# Patient Record
Sex: MALE | Race: NATIVE HAWAIIAN OR OTHER PACIFIC ISLANDER
[De-identification: names, ages, dates, MRNs, and addresses within clinical notes are randomized per-mention and may not be internally consistent; named-entity substitution may affect disease eponyms.]

---

## 2018-08-31 ENCOUNTER — HOSPITAL ENCOUNTER (EMERGENCY)
Dept: HOSPITAL 5 - ED | Age: 78
Discharge: HOME | End: 2018-08-31
Payer: MEDICARE

## 2018-08-31 VITALS — SYSTOLIC BLOOD PRESSURE: 142 MMHG | DIASTOLIC BLOOD PRESSURE: 84 MMHG

## 2018-08-31 DIAGNOSIS — S43.004A: Primary | ICD-10-CM
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
## 2018-08-31 DIAGNOSIS — L03.115: ICD-10-CM

## 2018-08-31 PROCEDURE — 73030 X-RAY EXAM OF SHOULDER: CPT

## 2018-08-31 PROCEDURE — 73020 X-RAY EXAM OF SHOULDER: CPT

## 2018-08-31 PROCEDURE — 23650 CLTX SHO DSLC W/MNPJ WO ANES: CPT

## 2018-08-31 PROCEDURE — 96375 TX/PRO/DX INJ NEW DRUG ADDON: CPT

## 2018-08-31 PROCEDURE — 99284 EMERGENCY DEPT VISIT MOD MDM: CPT

## 2018-08-31 PROCEDURE — 20610 DRAIN/INJ JOINT/BURSA W/O US: CPT

## 2018-08-31 PROCEDURE — 96374 THER/PROPH/DIAG INJ IV PUSH: CPT

## 2018-08-31 NOTE — XRAY REPORT
Left shoulder 3 views: 



History: Obvious swelling deformity status post fall.



Findings:



There is anterior dislocation noted of the humeral head. The humeral 

head is noted anteroinferior to the glenoid. No evidence of fracture.



Impression:



Anterior dislocation right shoulder

## 2018-08-31 NOTE — EMERGENCY DEPARTMENT REPORT
ED Extremity Problem HPI





- General


Chief complaint: Shoulder Injury


Stated complaint: SHOULDER PAIN


Time Seen by Provider: 08/31/18 13:19


Source: patient


Mode of arrival: Ambulatory


Limitations: No Limitations





- History of Present Illness


Initial comments: 





Mr. Boykin is a healthy 77 yo male who presents with shoulder injury.  

He fell today onto his right shoulder.  He slipped in the shower.  Denies any 

headache or neck pain.  Denies any other injury.  He has severe pain in right 

shoulder with deformity.  He's had lower extremity swelling for 2 weeks.  

Brother was going to take the patient to doctor today for possible spider bite 

on both lower extremities.  Redness and tenderness of the right lower leg. Our 

ED staff member provided Taiwanese interpretation.


MD Complaint: extremity pain


-: Sudden, hour(s) (2)


Location: right, upper extremity


History of Same: No


Severity scale (0 -10): 10


Quality: aching


Consistency: constant


Worsens with: other (movement)


Associated Symptoms: denies other symptoms





- Related Data


 Previous Rx's











 Medication  Instructions  Recorded  Last Taken  Type


 


Sulfamethoxazole/Trimethoprim 1 each PO BID 7 Days #14 tablet 08/31/18 Unknown 

Rx





[Bactrim DS TAB]    











 Allergies











Allergy/AdvReac Type Severity Reaction Status Date / Time


 


No Known Allergies Allergy   Unverified 08/31/18 11:54














ED Review of Systems


ROS: 


Stated complaint: SHOULDER PAIN


Other details as noted in HPI





Comment: All other systems reviewed and negative


Constitutional: denies: fever, malaise


Respiratory: denies: cough


Cardiovascular: denies: chest pain





ED Past Medical Hx





- Past Medical History


Previous Medical History?: No





- Surgical History


Past Surgical History?: No





- Social History


Smoking Status: Never Smoker





- Medications


Home Medications: 


 Home Medications











 Medication  Instructions  Recorded  Confirmed  Last Taken  Type


 


Sulfamethoxazole/Trimethoprim 1 each PO BID 7 Days #14 tablet 08/31/18  Unknown 

Rx





[Bactrim DS TAB]     














ED Physical Exam





- General


Limitations: No Limitations


General appearance: alert, in no apparent distress





- Head


Head exam: Present: atraumatic, normocephalic





- Eye


Eye exam: Present: normal appearance





- ENT


ENT exam: Present: mucous membranes moist





- Neck


Neck exam: Present: normal inspection





- Respiratory


Respiratory exam: Present: normal lung sounds bilaterally.  Absent: respiratory 

distress, wheezes, rales, rhonchi





- Cardiovascular


Cardiovascular Exam: Present: regular rate, normal rhythm, normal heart sounds.

  Absent: systolic murmur, diastolic murmur, rubs, gallop





- GI/Abdominal


GI/Abdominal exam: Present: soft, normal bowel sounds.  Absent: distended, 

tenderness, guarding, rebound





- Rectal


Rectal exam: Present: deferred





- Extremities Exam


Extremities exam: Present: other (right shoulder squared off intact skin, 

intact hand  2+ radial pulse, intact sensation throughout the extremity)





- Back Exam


Back exam: Present: normal inspection





- Neurological Exam


Neurological exam: Present: alert, oriented X3





- Psychiatric


Psychiatric exam: Present: normal affect, normal mood





- Skin


Skin exam: Present: warm, dry, intact, normal color.  Absent: rash





ED Course


 Vital Signs











  08/31/18





  13:57


 


Respiratory 18





Rate 














- Joint Aspiration/Injection


Consent Obtained: verbal consent


Time Out Performed: Yes


Indications: to relieve pressure/pain


Side of Body: right


Joint Aspirated: shoulder


Ultrasound Guidance: No


Skin Prep: Povidone-Iodine1%


Local Anesthesia Used: Lidocaine 1%


Amount of Anesthesia Used (mls): 20


Needle Size Used: Other (21)


Syringe Size Used: Other (35cc)


Medication Injected, if any: Lidocaine


Amount of Medication Injected (mls): 20


Patient Tolerated Procedure: well





- Orthopedic Joint Reduction


  ** Joint #1


Consent Obtained: verbal consent


Time Out Performed: Yes


Side: right


Joint Reduction Location: shoulder


Analgesia: none (joint infection)


Local Anesthetic Used: Lidocaine 1%


Amount of Anesthetic Used (mls): 20


Shoulder Technique Used (if applicable): external rotation, Cunningham


Technique Used: direct manipulation


Post-Reduction Neuro Exam: intact


Post-Reduction Vascular Exam: intact


Post Reduction X-Ray Obtained: Yes


Post Reduction X-Ray Results: reduced


Splint Applied: Yes (shoulder immobilizer)


Patient Tolerated Procedure: well





ED Medical Decision Making





- Radiology Data


Radiology results: report reviewed, image reviewed


interpreted by me: 





post reduction xray showed adequately reduced right shoulder





first x-ray: anterior shoulder dislocation





- Medical Decision Making





Mr. Boykin presents with right shoulder dislocation after fall.  

Neurovascularly intact.  Reduced after joint injection of lidocaine.





dc'd home with shoulder immobilizer and rx for bactrim for mild cellulitis in 

right leg


Critical care attestation.: 


If time is entered above; I have spent that time in minutes in the direct care 

of this critically ill patient, excluding procedure time.








ED Disposition


Clinical Impression: 


 Cellulitis of leg, right, Anterior dislocation of right shoulder





Disposition: DC-01 TO HOME OR SELFCARE


Is pt being admited?: No


Does the pt Need Aspirin: No


Condition: Stable


Instructions:  Shoulder Dislocation (ED), Cellulitis (ED)


Prescriptions: 


Sulfamethoxazole/Trimethoprim [Bactrim DS TAB] 1 each PO BID 7 Days #14 tablet


Referrals: 


PRIMARY CARE,MD [Primary Care Provider] - 3-5 Days


Time of Disposition: 15:24


Print Language: Romanian

## 2018-08-31 NOTE — XRAY REPORT
FINAL REPORT



EXAM:  XR SHOULDER 1V RT



HISTORY:  Post  reduction 



COMPARISON:  None.



TECHNIQUE:  Two views of the right shoulder



FINDINGS:  

There is no acute fracture or dislocation. The glenohumeral and

acromioclavicular joint spaces are preserved. The overlying soft

tissues are intact.



IMPRESSION:  

No acute bony abnormality of the right shoulder.

## 2020-03-03 ENCOUNTER — HOSPITAL ENCOUNTER (EMERGENCY)
Dept: HOSPITAL 5 - ED | Age: 80
End: 2020-03-03
Payer: MEDICARE

## 2020-03-03 DIAGNOSIS — I46.9: Primary | ICD-10-CM

## 2020-03-03 NOTE — EMERGENCY DEPARTMENT REPORT
ED CPR HPI





- General


Stated Complaint: CARDIAC ARREST


Time Seen by Provider: 20 17:59





- History of Present Illness


Initial Comments: 





Patient is a 79-year-old  male who had just returned home from work with

his brother.  Patient sat on the couch and was found having difficulty breathing

and was unresponsive.  By the time paramedics got there 10 minutes after the 

incident occurred patient was pulseless.  Patient was intubated prior to arrival

and CPR initiated.  Patient was in PEA.  3 rounds of epi were given prior to the

patient's arrival.


MD Complaint: stopped breathing


Place: home


Shock Advised: No


Initial Findings in the Field: no pulse, PEA


ROSC in the Field: No


Associated Injuries: No


Treatments Prior to Arrival: intubation





- Related Data


                                  Previous Rx's











 Medication  Instructions  Recorded  Last Taken  Type


 


Sulfamethoxazole/Trimethoprim 1 each PO BID 7 Days #14 tablet 18 Unknown 

Rx





[Bactrim DS TAB]    











                                    Allergies











Allergy/AdvReac Type Severity Reaction Status Date / Time


 


No Known Allergies Allergy   Unverified 18 11:54














ED Review of Systems


ROS: 


Stated complaint: CARDIAC ARREST


Other details as noted in HPI





Comment: All other systems reviewed and negative





ED Past Medical Hx





- Social History


Smoking Status: Never Smoker





- Medications


Home Medications: 


                                Home Medications











 Medication  Instructions  Recorded  Confirmed  Last Taken  Type


 


Sulfamethoxazole/Trimethoprim 1 each PO BID 7 Days #14 tablet 18  Unknown 

Rx





[Bactrim DS TAB]     














ED Physical Exam





- General


General appearance: obtunded





- Head


Head exam: Present: atraumatic, normocephalic





- Eye


Eye exam: Present: other (Pupils are fixed and dilated)





- ENT


ENT exam: Present: mucous membranes moist





- Neck


Neck exam: Present: normal inspection





- Respiratory


Respiratory exam: Absent: normal lung sounds bilaterally (No spontaneous breath 

sounds.  Clear with bagging.)





- Cardiovascular


Cardiovascular Exam: Present: other (No spontaneous heart tones are appreciated.

 Ultrasound was used which showed no organized cardiac movement)





- GI/Abdominal


GI/Abdominal exam: Present: soft, distended





- Rectal


Rectal exam: Present: deferred





- Extremities Exam


Extremities exam: Present: normal inspection





- Back Exam


Back exam: Present: normal inspection





- Skin


Skin exam: Present: warm, dry, intact, normal color.  Absent: rash





ED Medical Decision Making





- Medical Decision Making





Please see code sheet.  Patient arrived in PEA.  Epinephrine had been given 

prior to arrival and we continued to give q. 3-minute epinephrine shots.  

Patient was given an amp of bicarb and calcium chloride on arrival as well..  

Patient's rhythm did change to ventricular fibrillation.  Patient was shocked 

twice with 200 J biphasic.  There is again no return of spontaneous circulation 

the patient was pronounced dead.  Alerted family to the patient's death.


Critical care attestation.: 


If time is entered above; I have spent that time in minutes in the direct care 

of this critically ill patient, excluding procedure time.








ED Disposition


Clinical Impression: 


 Cardiopulmonary arrest





Disposition: DC-20 


Is pt being admited?: No


Does the pt Need Aspirin: No


Condition: Stable


Referrals: 


PRIMARY CARE,MD [Primary Care Provider] - 3-5 Days


Time of Disposition: 18:18